# Patient Record
Sex: MALE | Race: WHITE | URBAN - METROPOLITAN AREA
[De-identification: names, ages, dates, MRNs, and addresses within clinical notes are randomized per-mention and may not be internally consistent; named-entity substitution may affect disease eponyms.]

---

## 2022-06-09 ENCOUNTER — OFFICE VISIT (OUTPATIENT)
Dept: URBAN - METROPOLITAN AREA CLINIC 24 | Facility: CLINIC | Age: 57
End: 2022-06-09
Payer: MEDICARE

## 2022-06-09 DIAGNOSIS — E11.9 TYPE 2 DIABETES MELLITUS WITHOUT COMPLICATIONS: Primary | ICD-10-CM

## 2022-06-09 DIAGNOSIS — H05.243 CONSTANT EXOPHTHALMOS, BILATERAL: ICD-10-CM

## 2022-06-09 DIAGNOSIS — H52.13 MYOPIA, BILATERAL: ICD-10-CM

## 2022-06-09 DIAGNOSIS — H02.834 DERMATOCHALASIS OF LEFT UPPER EYELID: ICD-10-CM

## 2022-06-09 DIAGNOSIS — H02.832 DERMATOCHALASIS OF RIGHT LOWER EYELID: ICD-10-CM

## 2022-06-09 DIAGNOSIS — Z79.4 LONG TERM (CURRENT) USE OF INSULIN: ICD-10-CM

## 2022-06-09 DIAGNOSIS — H25.813 COMBINED FORMS OF AGE-RELATED CATARACT, BILATERAL: ICD-10-CM

## 2022-06-09 PROCEDURE — 99204 OFFICE O/P NEW MOD 45 MIN: CPT | Performed by: OPTOMETRIST

## 2022-06-09 PROCEDURE — 92134 CPTRZ OPH DX IMG PST SGM RTA: CPT | Performed by: OPTOMETRIST

## 2022-06-09 ASSESSMENT — INTRAOCULAR PRESSURE
OS: 19
OD: 19

## 2022-06-09 ASSESSMENT — VISUAL ACUITY
OS: 20/20
OD: 20/20

## 2022-06-09 NOTE — IMPRESSION/PLAN
Impression: Dermatochalasis of left upper eyelid: H02.834.
- pt reports new onset w/ conj chemosis Plan: See ocplx plan above.

## 2022-06-09 NOTE — IMPRESSION/PLAN
Impression: Dermatochalasis of right lower eyelid: H02.832.
-- pt reports new onset w/ conj chemosis Plan: See ocplx plan above.

## 2022-06-09 NOTE — IMPRESSION/PLAN
Impression: Type 2 diabetes mellitus without complications: M31.0. Plan: No diabetic retinopathy. Recommend yearly diabetic eye exam.  Discussed with patient the importance of good blood sugar control.

## 2022-06-09 NOTE — IMPRESSION/PLAN
Impression: Constant exophthalmos, bilateral: H05.243.
-- + Graves w/ thyroid storm, cardiomyopathy, new onset proptosis Jan '22
-- denies diplopia, CVF full, no apd
-- Pradip 26 OD. 25 OS Plan: Pt edu. Will arrange consult w/ oculoplastics (tepezza candidate).

## 2022-06-09 NOTE — IMPRESSION/PLAN
Impression: Combined forms of age-related cataract, bilateral: H25.813. Plan: Early. C&I not indicated.